# Patient Record
Sex: MALE | Race: ASIAN | NOT HISPANIC OR LATINO
[De-identification: names, ages, dates, MRNs, and addresses within clinical notes are randomized per-mention and may not be internally consistent; named-entity substitution may affect disease eponyms.]

---

## 2024-08-04 ENCOUNTER — TRANSCRIPTION ENCOUNTER (OUTPATIENT)
Age: 64
End: 2024-08-04

## 2024-08-05 ENCOUNTER — RESULT REVIEW (OUTPATIENT)
Age: 64
End: 2024-08-05

## 2024-08-06 ENCOUNTER — RESULT REVIEW (OUTPATIENT)
Age: 64
End: 2024-08-06

## 2024-08-09 ENCOUNTER — RESULT REVIEW (OUTPATIENT)
Age: 64
End: 2024-08-09

## 2024-08-12 ENCOUNTER — NON-APPOINTMENT (OUTPATIENT)
Age: 64
End: 2024-08-12

## 2024-08-12 PROBLEM — I25.10 ATHEROSCLEROTIC HEART DISEASE OF NATIVE CORONARY ARTERY WITHOUT ANGINA PECTORIS: Chronic | Status: ACTIVE | Noted: 2024-08-03

## 2024-08-12 PROBLEM — E11.9 TYPE 2 DIABETES MELLITUS WITHOUT COMPLICATIONS: Chronic | Status: ACTIVE | Noted: 2024-08-03

## 2024-08-12 PROBLEM — Z00.00 ENCOUNTER FOR PREVENTIVE HEALTH EXAMINATION: Status: ACTIVE | Noted: 2024-08-12

## 2024-08-12 PROBLEM — N40.0 BENIGN PROSTATIC HYPERPLASIA WITHOUT LOWER URINARY TRACT SYMPTOMS: Chronic | Status: ACTIVE | Noted: 2024-08-03

## 2024-08-12 PROBLEM — I10 ESSENTIAL (PRIMARY) HYPERTENSION: Chronic | Status: ACTIVE | Noted: 2024-08-03

## 2024-08-13 ENCOUNTER — APPOINTMENT (OUTPATIENT)
Dept: CARE COORDINATION | Facility: HOME HEALTH | Age: 64
End: 2024-08-13
Payer: SELF-PAY

## 2024-08-13 VITALS
HEART RATE: 64 BPM | OXYGEN SATURATION: 99 % | SYSTOLIC BLOOD PRESSURE: 134 MMHG | DIASTOLIC BLOOD PRESSURE: 74 MMHG | RESPIRATION RATE: 14 BRPM

## 2024-08-13 PROCEDURE — 99024 POSTOP FOLLOW-UP VISIT: CPT

## 2024-08-13 RX ORDER — ATORVASTATIN CALCIUM 80 MG/1
80 TABLET, FILM COATED ORAL
Qty: 30 | Refills: 0 | Status: ACTIVE | COMMUNITY
Start: 2024-08-13

## 2024-08-13 RX ORDER — CLOPIDOGREL BISULFATE 75 MG/1
75 TABLET, FILM COATED ORAL
Refills: 0 | Status: ACTIVE | COMMUNITY
Start: 2024-08-13

## 2024-08-13 RX ORDER — PANTOPRAZOLE 40 MG/1
40 TABLET, DELAYED RELEASE ORAL
Qty: 30 | Refills: 0 | Status: ACTIVE | COMMUNITY
Start: 2024-08-13

## 2024-08-13 RX ORDER — ACETAMINOPHEN 325 MG/1
325 TABLET ORAL EVERY 6 HOURS
Qty: 56 | Refills: 0 | Status: ACTIVE | COMMUNITY
Start: 2024-08-13

## 2024-08-13 RX ORDER — COLCHICINE 0.6 MG/1
0.6 CAPSULE ORAL
Refills: 0 | Status: ACTIVE | COMMUNITY
Start: 2024-08-13

## 2024-08-13 RX ORDER — TAMSULOSIN HYDROCHLORIDE 0.4 MG/1
0.4 CAPSULE ORAL
Qty: 30 | Refills: 0 | Status: ACTIVE | COMMUNITY
Start: 2024-08-13

## 2024-08-13 RX ORDER — SITAGLIPTIN AND METFORMIN HYDROCHLORIDE 50; 1000 MG/1; MG/1
50-1000 TABLET, FILM COATED ORAL TWICE DAILY
Qty: 60 | Refills: 0 | Status: ACTIVE | COMMUNITY
Start: 2024-08-13

## 2024-08-13 RX ORDER — METOPROLOL TARTRATE 25 MG/1
25 TABLET, FILM COATED ORAL
Qty: 15 | Refills: 0 | Status: ACTIVE | COMMUNITY
Start: 2024-08-13

## 2024-08-13 RX ORDER — OXYCODONE 5 MG/1
5 TABLET ORAL EVERY 4 HOURS
Refills: 0 | Status: ACTIVE | COMMUNITY
Start: 2024-08-13

## 2024-08-13 RX ORDER — ASPIRIN ENTERIC COATED TABLETS 81 MG 81 MG/1
81 TABLET, DELAYED RELEASE ORAL DAILY
Qty: 30 | Refills: 0 | Status: ACTIVE | COMMUNITY
Start: 2024-08-13

## 2024-08-13 NOTE — HISTORY OF PRESENT ILLNESS
[FreeTextEntry1] : 65 yo M, pmhx, HTN, DM, BPH, CAD on ASA presents to [facility] transferred from [facility]. Patient initially presented to [facility] with chest pain. Patient got into an argument with family and suddenly developed sharp, squeezing chest pain that radiated down the arms w/ palpitations and jaw claudication episode lasted for 10-20 minutes and relieved with rest and aspirin. Patient was transferred to [facility] via ambulance, Plavix loaded in the ED. LHC at  showing multivessel CAD, TTE LVEF is 65-70% nml systolic function admitted to CCU for NTEMI started on heparin gtt. now transferred to [facility] for CABG eval.   s/p 24 C4L postop inotropic and pressor support extubated/ 2 units prbc given today insulin gttp for glycemic control  + diaphoresis w/ st elevations; colchicine started for PPS; Echo neg effusion; CT neg dissection  low grade temps; bc NTD; ua neg; rvp neg  Pt tx sdu; vss; RSR 70-80; afebrile; maintain  @ 2.5; insulin gttp for glycemic control; Endo consulted for dm management; colchicine and laxatives d/c secondary to diarrhea  Lasix IV and albumin for low urine output. maintain castle. maintain Dobutamine gtt. Echo performed Pending report. 8/10 TTE no regional wall motion abnormalities seen EF 67% DC PW and Trial of VOID this am  VSS stable and eager for discharge - Seen by [facility] NP for a f/u home visit. Emotional support and education provided.  All questions answered. Pt overall is recovering well w/o complaints. He is moving to [place]  today later on. Maricruz Guillory NP from [facility] team made aware as she covers the [place] region.

## 2024-08-13 NOTE — PLAN
[TextEntry] : 1) Weigh yourself in the AM prior to eating & drinking. Contact FY team if you note a wt gain of 1-2 lbs overnight or 5 lbs within 1 week. Continue current meds. Keep your legs elevated & ambulate as tolerated. Continue incentive spirometry 10x/hr while awake. Shower using mild soap daily. Your diet should be low salt, low fat, high protein. 2) Call FY team 24/7 w/ any questions, issues, or concerns. My number [phone] was provided to the pt. Explained to pt I personally work from Mon to Fri from 8a to 4p but before or after those hours this number still functions 24/7 and pt will get in touch w/ a team member of CT Surgeon at all times. 3) Report to your FY NP any signs of infection such as redness, swelling, warmth, drainage, or pain at an incision site. Additionally, report to your [facility] NP if you develop a fever. 4) Do not lift anything more than 5 lbs. 5) Do not drive until you are cleared to do so by your surgeon. 6) Please walk 3x/day.  7) Continue monitoring glucose levels. Keep a diary of your FS results to bring to your endocrinologist.  FOLLOW UP APPOINTMENTS: CTSx: Dr. Riddle (8-26-24)  CARDIOLOGIST: Pt does not have a cardiologist. I provided the Patient Access Referral phone number to the pt for them to call to find a cardiologist nearby home and accepting of insurance. Pt advised to call to schedule appt within 2 weeks PCP: Dr. Sincere Leary - Pt advised to call to schedule appt within 1 month of discharge. ENDO: Dr Juarez- F/u within 1-2 weeks

## 2024-08-13 NOTE — PHYSICAL EXAM
[Sclera] : the sclera and conjunctiva were normal [Neck Appearance] : the appearance of the neck was normal [] : no respiratory distress [Respiration, Rhythm And Depth] : normal respiratory rhythm and effort [Exaggerated Use Of Accessory Muscles For Inspiration] : no accessory muscle use [Auscultation Breath Sounds / Voice Sounds] : lungs were clear to auscultation bilaterally [Heart Rate And Rhythm] : heart rate was normal and rhythm regular [Apical Impulse] : the apical impulse was normal [Heart Sounds] : normal S1 and S2 [Heart Sounds Gallop] : no gallops [Murmurs] : no murmurs [FreeTextEntry1] : MSI, CT sites and LLE SVG site without erythema, drainage or warmth, with edges well approximated.  Sternum stable. BL LE -no edema. [Heart Sounds Pericardial Friction Rub] : no pericardial rub [Examination Of The Chest] : the chest was normal in appearance [Chest Visual Inspection Thoracic Asymmetry] : no chest asymmetry [Diminished Respiratory Excursion] : normal chest expansion [Bowel Sounds] : normal bowel sounds [Abdomen Soft] : soft [Skin Color & Pigmentation] : normal skin color and pigmentation [No Focal Deficits] : no focal deficits [Oriented To Time, Place, And Person] : oriented to person, place, and time [Impaired Insight] : insight and judgment were intact [Affect] : the affect was normal [Mood] : the mood was normal

## 2024-08-13 NOTE — ASSESSMENT
[FreeTextEntry1] : Pt recovering well at home s/p cardiac surgery. Good family support was noted from son. Pt has all medications in home and is taking as prescribed. Pt is aware of F/U appts scheduled and that need to be scheduled as listed below.

## 2024-08-13 NOTE — REASON FOR VISIT
[Post Hospitalization] : a post hospitalization visit [Family Member] : family member [FreeTextEntry1] : FOLLOW YOUR HEART - Transitional Care Management Program - [facility]

## 2024-08-19 PROBLEM — Z95.1 S/P CABG X 4: Status: ACTIVE | Noted: 2024-08-19

## 2024-08-26 ENCOUNTER — APPOINTMENT (OUTPATIENT)
Dept: CARDIOTHORACIC SURGERY | Facility: CLINIC | Age: 64
End: 2024-08-26
Payer: SELF-PAY

## 2024-08-26 VITALS
HEART RATE: 80 BPM | SYSTOLIC BLOOD PRESSURE: 125 MMHG | WEIGHT: 136 LBS | RESPIRATION RATE: 14 BRPM | OXYGEN SATURATION: 99 % | TEMPERATURE: 98 F | DIASTOLIC BLOOD PRESSURE: 77 MMHG

## 2024-08-26 DIAGNOSIS — Z95.1 PRESENCE OF AORTOCORONARY BYPASS GRAFT: ICD-10-CM

## 2024-08-26 PROCEDURE — 99024 POSTOP FOLLOW-UP VISIT: CPT

## 2024-08-26 NOTE — END OF VISIT
[FreeTextEntry3] : Written by Binu Falk NP, acting as a scribe for Dr. Riddle The documentation recorded by the scribe accurately reflects the service I personally performed and the decisions made by me. Signature Starr Riddle MD.

## 2024-08-26 NOTE — ASSESSMENT
[FreeTextEntry1] : Today on exam patient's lungs clear bilaterally, normal sinus rhythm, sternum stable, incision clean, dry and intact. Left LE SVG site is clean, dry and intact. No peripheral edema noted. Instructed patient on importance of optimal glycemic control, daily showering, daily weights, incentive spirometer use, and increase ambulation as tolerated. Instructed to call office with any signs or symptoms of infection or weight gain of 2 or more pounds in 1 day or 3 or more pounds in 1 week.   Plan:  64M s/p CABG doing well  - d/c colcochine - continue other meds - f/u cardiology - f/u prn

## 2024-08-26 NOTE — REASON FOR VISIT
[de-identified] : C4L [de-identified] : 8/5/2024 [de-identified] : 64 year old male pmhx, HTN, DM, BPH, CAD on ASA presents to [facility] [place] transferred from [facility]. Patient initially presented to [facility] with chest pain. Patient got into an argument with family and suddenly developed sharp, squeezing chest pain that radiated down the arms w/ palpitations and jaw claudication episode lasted for 10-20 minutes and relieved with rest and aspirin. Patient was transferred to [facility] via ambulance, Plavix loaded in the ED. LHC at 8/1 showing multivessel CAD, TTE LVEF is 65-70% nml systolic function admitted to CCU for NTEMI started on heparin gtt. now transferred to [facility] for CABG eval.  s/p 8/5/24 C4L.  Presents today with his son and wife and reports doing well. Reports loose stool and lose of taste, back to baseline functioning. Walking more everyday. Eating and drinking with +BM. Denies chest pain, SOB, swelling, weight gain, palpitations, cough, fever or chills. Seeing Dr. De Anda tomorrow for vascular.  Cardiologist through PCP.  Going back to [place] 10/10

## 2024-08-26 NOTE — REASON FOR VISIT
[de-identified] : C4L [de-identified] : 8/5/2024 [de-identified] : 64 year old male pmhx, HTN, DM, BPH, CAD on ASA presents to [facility] transferred from [facility]. Patient initially presented to [facility] with chest pain. Patient got into an argument with family and suddenly developed sharp, squeezing chest pain that radiated down the arms w/ palpitations and jaw claudication episode lasted for 10-20 minutes and relieved with rest and aspirin. Patient was transferred to [facility] via ambulance, Plavix loaded in the ED. LHC at 8/1 showing multivessel CAD, TTE LVEF is 65-70% nml systolic function admitted to CCU for NTEMI started on heparin gtt. now transferred to [facility] for CABG eval.  s/p 8/5/24 C4L.  Presents today with his son and wife and reports doing well. Reports loose stool and lose of taste, back to baseline functioning. Walking more everyday. Eating and drinking with +BM. Denies chest pain, SOB, swelling, weight gain, palpitations, cough, fever or chills. Seeing Dr. De Anda tomorrow for vascular.  Cardiologist through PCP.  Going back to [place] 10/10

## 2024-08-27 ENCOUNTER — APPOINTMENT (OUTPATIENT)
Dept: VASCULAR SURGERY | Facility: CLINIC | Age: 64
End: 2024-08-27
Payer: MEDICAID

## 2024-08-27 VITALS
HEIGHT: 65 IN | SYSTOLIC BLOOD PRESSURE: 120 MMHG | TEMPERATURE: 98 F | DIASTOLIC BLOOD PRESSURE: 77 MMHG | WEIGHT: 134 LBS | BODY MASS INDEX: 22.33 KG/M2 | HEART RATE: 77 BPM

## 2024-08-27 DIAGNOSIS — I65.23 OCCLUSION AND STENOSIS OF BILATERAL CAROTID ARTERIES: ICD-10-CM

## 2024-08-27 DIAGNOSIS — Z78.9 OTHER SPECIFIED HEALTH STATUS: ICD-10-CM

## 2024-08-27 PROCEDURE — 99213 OFFICE O/P EST LOW 20 MIN: CPT

## 2024-08-27 RX ORDER — PANTOPRAZOLE 40 MG/1
40 TABLET, DELAYED RELEASE ORAL
Refills: 0 | Status: ACTIVE | COMMUNITY

## 2024-08-27 RX ORDER — ASPIRIN 81 MG/1
81 TABLET, CHEWABLE ORAL
Refills: 0 | Status: ACTIVE | COMMUNITY

## 2024-08-27 RX ORDER — CLOPIDOGREL BISULFATE 75 MG/1
75 TABLET, FILM COATED ORAL
Refills: 0 | Status: ACTIVE | COMMUNITY

## 2024-08-27 RX ORDER — METFORMIN HYDROCHLORIDE 625 MG/1
TABLET ORAL
Refills: 0 | Status: ACTIVE | COMMUNITY

## 2024-08-27 RX ORDER — ATORVASTATIN CALCIUM 80 MG/1
80 TABLET, FILM COATED ORAL
Refills: 0 | Status: ACTIVE | COMMUNITY

## 2024-08-27 RX ORDER — METOPROLOL SUCCINATE 25 MG/1
25 TABLET, EXTENDED RELEASE ORAL
Refills: 0 | Status: ACTIVE | COMMUNITY

## 2024-08-27 RX ORDER — TAMSULOSIN HYDROCHLORIDE 0.4 MG/1
0.4 CAPSULE ORAL
Refills: 0 | Status: ACTIVE | COMMUNITY

## 2024-08-27 NOTE — ASSESSMENT
[FreeTextEntry1] : Impression asymptomatic carotid stenosis    Plan Med Conservative management  f/u w cardiology  and CTS prn  continue  baby asa  ov  w carotid duplex 6 mo march 2025   [Arterial/Venous Disease] : arterial/venous disease [Medication Management] : medication management

## 2024-08-27 NOTE — HISTORY OF PRESENT ILLNESS
[FreeTextEntry1] : Pt is s/o [facility] Pt on baby asa daily pt w son in attendance vasc surg consult  NSM carotid duplex  Aug 2024 Rt  Lt 216 CTA Neck sig for Rt ICA greater than 70% and Lt ICA 70%

## 2024-08-27 NOTE — PHYSICAL EXAM
[Alert] : alert [Oriented to Person] : oriented to person [Oriented to Place] : oriented to place [Oriented to Time] : oriented to time [Calm] : calm [JVD] : no jugular venous distention  [2+] : left 2+ [Right Carotid Bruit] : right carotid bruit heard [Left Carotid Bruit] : left carotid bruit heard [de-identified] : nad [de-identified] : wnl [de-identified] : no resp distress [de-identified] : wnl [de-identified] : Viet Cranial nerves 2-12 viet grossly intact [de-identified] : cooperative

## 2025-03-04 ENCOUNTER — APPOINTMENT (OUTPATIENT)
Dept: VASCULAR SURGERY | Facility: CLINIC | Age: 65
End: 2025-03-04